# Patient Record
Sex: MALE | Race: WHITE | URBAN - METROPOLITAN AREA
[De-identification: names, ages, dates, MRNs, and addresses within clinical notes are randomized per-mention and may not be internally consistent; named-entity substitution may affect disease eponyms.]

---

## 2024-03-21 ENCOUNTER — HOSPITAL ENCOUNTER (OUTPATIENT)
Age: 39
Discharge: HOME | End: 2024-03-21
Payer: COMMERCIAL

## 2024-03-21 VITALS
TEMPERATURE: 99 F | WEIGHT: 270 LBS | RESPIRATION RATE: 18 BRPM | OXYGEN SATURATION: 98 % | HEART RATE: 73 BPM | SYSTOLIC BLOOD PRESSURE: 161 MMHG | HEIGHT: 75 IN | DIASTOLIC BLOOD PRESSURE: 98 MMHG | BODY MASS INDEX: 33.57 KG/M2

## 2024-03-21 DIAGNOSIS — L23.7 ALLERGIC DERMATITIS DUE TO POISON IVY: Primary | ICD-10-CM

## 2024-03-21 RX ORDER — ATORVASTATIN CALCIUM 10 MG/1
10 TABLET, FILM COATED ORAL NIGHTLY
COMMUNITY

## 2024-03-21 RX ORDER — PREDNISONE 20 MG/1
TABLET ORAL
Qty: 11 TABLET | Refills: 0 | Status: SHIPPED | OUTPATIENT
Start: 2024-03-21 | End: 2024-03-31

## 2024-03-21 NOTE — ED PROVIDER NOTES
Patient Seen in: [facility]      History     Chief Complaint   Patient presents with    Rash     I believe I have a poison ivy rash that is spreading. - Entered by patient    Rash Skin Problem     Stated Complaint: Rash - I believe I have a poison ivy rash that is spreading.    Subjective:   The history is provided by the patient.       37 yo male with no significant PMH presents to the  due to itchy rash x 1 week. The rash started 1 week ago - mainly on the wrist and now has spread on bilat UE and LE. Started as a \"bumpy rash\" but slowly \"drying up with calamine lotion\". Started after ripping out poison ivy at home- similar reaction in the past. Initially believed it was improving but now spreading over the last several days now on bilat arms and legs. No angioedema, difficulty breathing, abdominal pain, NV.     Objective:   No pertinent past medical history.            No pertinent past surgical history.              No pertinent social history.            Review of Systems   Constitutional: Negative.    Respiratory: Negative.     Cardiovascular: Negative.    Gastrointestinal: Negative.    Skin:  Positive for rash.       Positive for stated complaint: Rash - I believe I have a poison ivy rash that is spreading.  Other systems are as noted in HPI.  Constitutional and vital signs reviewed.      All other systems reviewed and negative except as noted above.    Physical Exam     ED Triage Vitals [03/21/24 0941]   BP (!) 161/98   Pulse 73   Resp 18   Temp 98.5 °F (36.9 °C)   Temp src Temporal   SpO2 98 %   O2 Device None (Room air)       Current:BP (!) 161/98   Pulse 73   Temp 98.5 °F (36.9 °C) (Temporal)   Resp 18   Ht 190.5 cm (6' 3\")   Wt 122.5 kg   SpO2 98%   BMI 33.75 kg/m²         Physical Exam  Vitals and nursing note reviewed.   Constitutional:       General: He is not in acute distress.     Appearance: Normal appearance. He is not toxic-appearing.   HENT:      Head:  Normocephalic.      Nose: Nose normal.      Mouth/Throat:      Comments: No angioedema   Eyes:      Extraocular Movements: Extraocular movements intact.      Conjunctiva/sclera: Conjunctivae normal.      Pupils: Pupils are equal, round, and reactive to light.   Pulmonary:      Effort: Pulmonary effort is normal.   Skin:     Comments: Erythematous papular rash which blanches on bilat wrists, UE and LE. No vesicles, no ulcers. No pedal edema.    Neurological:      General: No focal deficit present.      Mental Status: He is alert and oriented to person, place, and time.   Psychiatric:         Mood and Affect: Mood normal.         Behavior: Behavior normal.               ED Course   Labs Reviewed - No data to display                   MDM     ddx - dermatitis, allergic reaction, scabies     On exam the patient Is afebrile nontoxic.  Vital signs are stable.  No angioedema.  No systemic symptoms.  Erythematous macular papular rash which blanches.  Bilateral upper extremities lower extremities.  Few on the palms.  No oral lesions.  No pedal edema.  Exam is consistent with a dermatitis most likely related to poison ivy due to his exposure.  Will start on a long taper of prednisone.  Advised to use Zyrtec over-the-counter.  Strict return precautions were discussed at length and all questions were answered                             Medical Decision Making  Problems Addressed:  Allergic dermatitis due to poison ivy: acute illness or injury        Disposition and Plan     Clinical Impression:  1. Allergic dermatitis due to poison ivy         Disposition:  Discharge  3/21/2024 10:27 am    Follow-up:  [facility]  [address] [place] [id number]              Medications Prescribed:  Discharge Medication List as of 3/21/2024 10:27 AM        START taking these medications    Details   predniSONE 20 MG Oral Tab Take 2 tablets (40 mg total) by mouth daily for 3 days, THEN 1 tablet (20 mg total) daily for 3  days, THEN 0.5 tablets (10 mg total) daily for 4 days., Normal, Disp-11 tablet, R-0

## 2024-03-21 NOTE — DISCHARGE INSTRUCTIONS
Can use zyrtec daily to help with itching  Take the prednisone as directed  Follow up with primary care doctor in 48 hours  Return to the ER if symptoms worsen

## 2024-03-21 NOTE — ED INITIAL ASSESSMENT (HPI)
Pt c/o rashes to both lower arms and right hand. Pt states he was in the yard about 1 week ago. Pt states he's had poison ivy before and concerned this rash my be poison ivy. Pt has red scabbed over area to his arms and right hand.  Pt has some rashes to his lower legs. Pt states the rash is itchy.

## 2024-07-30 ENCOUNTER — OFFICE VISIT (OUTPATIENT)
Dept: INTERNAL MEDICINE CLINIC | Facility: CLINIC | Age: 39
End: 2024-07-30
Payer: COMMERCIAL

## 2024-07-30 VITALS
BODY MASS INDEX: 35.28 KG/M2 | SYSTOLIC BLOOD PRESSURE: 146 MMHG | HEART RATE: 77 BPM | RESPIRATION RATE: 16 BRPM | TEMPERATURE: 97 F | OXYGEN SATURATION: 97 % | WEIGHT: 272 LBS | DIASTOLIC BLOOD PRESSURE: 82 MMHG | HEIGHT: 73.5 IN

## 2024-07-30 DIAGNOSIS — M79.671 BILATERAL FOOT PAIN: ICD-10-CM

## 2024-07-30 DIAGNOSIS — I10 PRIMARY HYPERTENSION: Primary | ICD-10-CM

## 2024-07-30 DIAGNOSIS — Z13.29 THYROID DISORDER SCREEN: ICD-10-CM

## 2024-07-30 DIAGNOSIS — G47.33 OSA ON CPAP: ICD-10-CM

## 2024-07-30 DIAGNOSIS — E78.00 HYPERCHOLESTEREMIA: ICD-10-CM

## 2024-07-30 DIAGNOSIS — Z13.0 SCREENING FOR DEFICIENCY ANEMIA: ICD-10-CM

## 2024-07-30 DIAGNOSIS — M79.672 BILATERAL FOOT PAIN: ICD-10-CM

## 2024-07-30 PROCEDURE — 99203 OFFICE O/P NEW LOW 30 MIN: CPT | Performed by: FAMILY MEDICINE

## 2024-07-30 RX ORDER — LOSARTAN POTASSIUM 50 MG/1
1 TABLET ORAL DAILY
COMMUNITY
Start: 2024-04-16 | End: 2024-07-30

## 2024-07-30 RX ORDER — LOSARTAN POTASSIUM 50 MG/1
50 TABLET ORAL DAILY
Qty: 90 TABLET | Refills: 3 | Status: SHIPPED | OUTPATIENT
Start: 2024-07-30

## 2024-07-30 RX ORDER — ATORVASTATIN CALCIUM 20 MG/1
20 TABLET, FILM COATED ORAL EVERY MORNING
Qty: 90 TABLET | Refills: 3 | Status: SHIPPED | OUTPATIENT
Start: 2024-07-30

## 2024-07-30 RX ORDER — ATORVASTATIN CALCIUM 20 MG/1
20 TABLET, FILM COATED ORAL EVERY MORNING
COMMUNITY
End: 2024-07-30

## 2024-07-30 NOTE — PROGRESS NOTES
Bert Dalal  7/3/1985    Chief Complaint   Patient presents with    [facility]     RM 9 - DV    Establish care, NP       HPI:   Bert Dalal is a 39 year old male who presents to [facility]. He works in equipment sales and his wife is an NP at [employer]. He is on treatment for HTN and HLD. On LEONOR for CPAP. He is struggling to loose weight, feels limited due to chronic bilateral foot pain that has been present for a few years. Most notable after long periods of walking or activity.     Current Outpatient Medications   Medication Sig Dispense Refill    atorvastatin 20 MG Oral Tab Take 1 tablet (20 mg total) by mouth every morning.      losartan 50 MG Oral Tab Take 1 tablet (50 mg total) by mouth daily.        No Known Allergies   Past Medical History:    Essential hypertension    Hyperlipidemia      There is no problem list on file for this patient.     No past surgical history on file.   No family history on file.   Social History     Socioeconomic History    Marital status:    Tobacco Use    Smoking status: Never    Smokeless tobacco: Never   Vaping Use    Vaping status: Never Used   Substance and Sexual Activity    Alcohol use: Yes     Comment: Social drinker    Drug use: Not Currently     Social Determinants of Health     Food Insecurity: Low Risk  (4/10/2023)    Received from [facility]    Food Insecurity     Have there been times that your food ran out, and you didn't have money to get more?: No     Are there times that you worry that this might happen?: No   Transportation Needs: Low Risk  (4/10/2023)    Received from [facility]    Transportation Needs     Do you have trouble getting transportation to medical appointments?: No     How do you normally get to and from your appointments?: Other   Social Connections: Low Risk  (9/15/2021)    Received from [facility]  [facility]    Social Connections     Do you have someone you could call for help if needed?: Yes   Housing Stability: Low Risk  (4/10/2023)    [facility], [facility]    Housing Stability     Are you concerned about having a safe and reliable place to live?: No         REVIEW OF SYSTEMS:   GENERAL: feels well otherwise    EXAM:   /82   Pulse 77   Temp 97 °F (36.1 °C)   Resp 16   Ht 6' 1.5\" (1.867 m)   Wt 272 lb (123.4 kg)   SpO2 97%   BMI 35.40 kg/m²   GENERAL: Well developed, well nourished,in no apparent distress  LUNGS: normal work of breathing  CARDIO: RRR  MSK: pes planus bilaterally, no significant bony tenderness or tenderness through the plantar fascia.      ASSESSMENT AND PLAN:   Bert Dalal is a 39 year old male who presents to [facility] care    Primary hypertension  BP elevated in office. Will begin home monitoring over the next 4 weeks. Follow-up at that time with log and cuff.   - losartan 50 MG Oral Tab; Take 1 tablet (50 mg total) by mouth daily.  Dispense: 90 tablet; Refill: 3  - Comp Metabolic Panel (14)    Hypercholesteremia  Continue statin, lipid panel prior to CPE  - atorvastatin 20 MG Oral Tab; Take 1 tablet (20 mg total) by mouth every morning.  Dispense: 90 tablet; Refill: 3  - Comp Metabolic Panel (14)  - Lipid Panel    LEONOR on CPAP  Stable on CPAP  - Pulmonary Referral - In Network    Bilateral foot pain  Will begin evaluation with weightbearing radiographs. Recommend evaluation with Dr. Day.   - Podiatry Referral - In Network  - XR Foot Weightbearing (3 views), Left (CPT = 51100) - [facility] consult only; Future  - XR Foot Weightbearing (3 views), right (CPT = 94482) - [facility] consult only; Future    Obesity  Thyroid disorder screen  Will begin metabolic evaluation. Discussed exercise goals and dietary optimization. Discussed food logging with [employer] jeff. F/U at CPE  - TSH W Reflex To  Free T4    F/U in 4 weeks for CPE    All questions were answered and the patient agrees with the plan.     Thank you,  Feliciano Herrera MD

## 2024-08-26 ENCOUNTER — LAB ENCOUNTER (OUTPATIENT)
Dept: LAB | Age: 39
End: 2024-08-26
Attending: FAMILY MEDICINE
Payer: COMMERCIAL

## 2024-08-26 LAB
ALBUMIN SERPL-MCNC: 4.6 G/DL (ref 3.2–4.8)
ALBUMIN/GLOB SERPL: 1.7 {RATIO} (ref 1–2)
ALP LIVER SERPL-CCNC: 89 U/L
ALT SERPL-CCNC: 75 U/L
ANION GAP SERPL CALC-SCNC: 3 MMOL/L (ref 0–18)
AST SERPL-CCNC: 35 U/L (ref ?–34)
BASOPHILS # BLD AUTO: 0.07 X10(3) UL (ref 0–0.2)
BASOPHILS NFR BLD AUTO: 1.1 %
BILIRUB SERPL-MCNC: 0.8 MG/DL (ref 0.3–1.2)
BUN BLD-MCNC: 10 MG/DL (ref 9–23)
CALCIUM BLD-MCNC: 9.8 MG/DL (ref 8.7–10.4)
CHLORIDE SERPL-SCNC: 102 MMOL/L (ref 98–112)
CHOLEST SERPL-MCNC: 171 MG/DL (ref ?–200)
CO2 SERPL-SCNC: 31 MMOL/L (ref 21–32)
CREAT BLD-MCNC: 1.02 MG/DL
EGFRCR SERPLBLD CKD-EPI 2021: 96 ML/MIN/1.73M2 (ref 60–?)
EOSINOPHIL # BLD AUTO: 0.51 X10(3) UL (ref 0–0.7)
EOSINOPHIL NFR BLD AUTO: 7.9 %
ERYTHROCYTE [DISTWIDTH] IN BLOOD BY AUTOMATED COUNT: 13 %
FASTING PATIENT LIPID ANSWER: YES
FASTING STATUS PATIENT QL REPORTED: YES
GLOBULIN PLAS-MCNC: 2.7 G/DL (ref 2–3.5)
GLUCOSE BLD-MCNC: 118 MG/DL (ref 70–99)
HCT VFR BLD AUTO: 42.9 %
HDLC SERPL-MCNC: 35 MG/DL (ref 40–59)
HGB BLD-MCNC: 14.9 G/DL
IMM GRANULOCYTES # BLD AUTO: 0.03 X10(3) UL (ref 0–1)
IMM GRANULOCYTES NFR BLD: 0.5 %
LDLC SERPL CALC-MCNC: 102 MG/DL (ref ?–100)
LYMPHOCYTES # BLD AUTO: 2.25 X10(3) UL (ref 1–4)
LYMPHOCYTES NFR BLD AUTO: 34.8 %
MCH RBC QN AUTO: 29.2 PG (ref 26–34)
MCHC RBC AUTO-ENTMCNC: 34.7 G/DL (ref 31–37)
MCV RBC AUTO: 84.1 FL
MONOCYTES # BLD AUTO: 0.61 X10(3) UL (ref 0.1–1)
MONOCYTES NFR BLD AUTO: 9.4 %
NEUTROPHILS # BLD AUTO: 2.99 X10 (3) UL (ref 1.5–7.7)
NEUTROPHILS # BLD AUTO: 2.99 X10(3) UL (ref 1.5–7.7)
NEUTROPHILS NFR BLD AUTO: 46.3 %
NONHDLC SERPL-MCNC: 136 MG/DL (ref ?–130)
OSMOLALITY SERPL CALC.SUM OF ELEC: 282 MOSM/KG (ref 275–295)
PLATELET # BLD AUTO: 225 10(3)UL (ref 150–450)
POTASSIUM SERPL-SCNC: 3.8 MMOL/L (ref 3.5–5.1)
PROT SERPL-MCNC: 7.3 G/DL (ref 5.7–8.2)
RBC # BLD AUTO: 5.1 X10(6)UL
SODIUM SERPL-SCNC: 136 MMOL/L (ref 136–145)
TRIGL SERPL-MCNC: 193 MG/DL (ref 30–149)
TSI SER-ACNC: 1.75 MIU/ML (ref 0.55–4.78)
VLDLC SERPL CALC-MCNC: 32 MG/DL (ref 0–30)
WBC # BLD AUTO: 6.5 X10(3) UL (ref 4–11)

## 2024-08-26 PROCEDURE — 36415 COLL VENOUS BLD VENIPUNCTURE: CPT | Performed by: FAMILY MEDICINE

## 2024-08-26 PROCEDURE — 83036 HEMOGLOBIN GLYCOSYLATED A1C: CPT | Performed by: FAMILY MEDICINE

## 2024-08-26 PROCEDURE — 85025 COMPLETE CBC W/AUTO DIFF WBC: CPT | Performed by: FAMILY MEDICINE

## 2024-08-26 PROCEDURE — 80053 COMPREHEN METABOLIC PANEL: CPT | Performed by: FAMILY MEDICINE

## 2024-08-26 PROCEDURE — 80061 LIPID PANEL: CPT | Performed by: FAMILY MEDICINE

## 2024-08-26 PROCEDURE — 84443 ASSAY THYROID STIM HORMONE: CPT | Performed by: FAMILY MEDICINE

## 2024-08-27 ENCOUNTER — OFFICE VISIT (OUTPATIENT)
Dept: INTERNAL MEDICINE CLINIC | Facility: CLINIC | Age: 39
End: 2024-08-27
Payer: COMMERCIAL

## 2024-08-27 VITALS
TEMPERATURE: 97 F | OXYGEN SATURATION: 97 % | WEIGHT: 271 LBS | HEART RATE: 87 BPM | DIASTOLIC BLOOD PRESSURE: 80 MMHG | BODY MASS INDEX: 35 KG/M2 | SYSTOLIC BLOOD PRESSURE: 140 MMHG

## 2024-08-27 DIAGNOSIS — I10 PRIMARY HYPERTENSION: ICD-10-CM

## 2024-08-27 DIAGNOSIS — E78.00 HYPERCHOLESTEREMIA: ICD-10-CM

## 2024-08-27 DIAGNOSIS — R79.89 ELEVATED LFTS: ICD-10-CM

## 2024-08-27 DIAGNOSIS — Z00.00 WELLNESS EXAMINATION: Primary | ICD-10-CM

## 2024-08-27 DIAGNOSIS — Z80.42 FAMILY HISTORY OF PROSTATE CANCER: ICD-10-CM

## 2024-08-27 DIAGNOSIS — R73.01 ELEVATED FASTING BLOOD SUGAR: ICD-10-CM

## 2024-08-27 DIAGNOSIS — E66.9 OBESITY (BMI 30-39.9): ICD-10-CM

## 2024-08-27 LAB
EST. AVERAGE GLUCOSE BLD GHB EST-MCNC: 160 MG/DL (ref 68–126)
HBA1C MFR BLD: 7.2 % (ref ?–5.7)

## 2024-08-27 PROCEDURE — 99395 PREV VISIT EST AGE 18-39: CPT | Performed by: FAMILY MEDICINE

## 2024-08-27 RX ORDER — LOSARTAN POTASSIUM 100 MG/1
100 TABLET ORAL DAILY
Qty: 90 TABLET | Refills: 1 | Status: SHIPPED | OUTPATIENT
Start: 2024-08-27

## 2024-08-27 NOTE — PROGRESS NOTES
Bert Dalal  7/3/1985    Chief Complaint   Patient presents with    Physical     F/u on lab results and review BP readings        HPI:   Bert Dalal is a 39 year old male who presents for CPE. His BP has been mild elevated at home 140/80-90's. He has been consistent with his atorvastatin and losartan. He has not yet implemented strong dietary changes and has yet started to exercise.     Current Outpatient Medications   Medication Sig Dispense Refill    atorvastatin 20 MG Oral Tab Take 1 tablet (20 mg total) by mouth every morning. 90 tablet 3    losartan 50 MG Oral Tab Take 1 tablet (50 mg total) by mouth daily. 90 tablet 3      No Known Allergies   Past Medical History:    Essential hypertension    Hyperlipidemia      There is no problem list on file for this patient.     History reviewed. No pertinent surgical history.   History reviewed. No pertinent family history.   Social History     Socioeconomic History    Marital status:    Tobacco Use    Smoking status: Never    Smokeless tobacco: Never   Vaping Use    Vaping status: Never Used   Substance and Sexual Activity    Alcohol use: Yes     Comment: Social drinker    Drug use: Not Currently     Social Determinants of Health     Food Insecurity: Low Risk  (4/10/2023)    Received from [facility]    Food Insecurity     Have there been times that your food ran out, and you didn't have money to get more?: No     Are there times that you worry that this might happen?: No   Transportation Needs: Low Risk  (4/10/2023)    Received from [facility]    Transportation Needs     Do you have trouble getting transportation to medical appointments?: No     How do you normally get to and from your appointments?: Other   Social Connections: Low Risk  (9/15/2021)    Received from [facility]    Social  Connections     Do you have someone you could call for help if needed?: Yes   Housing Stability: Low Risk  (4/10/2023)    [facility], [facility]    Housing Stability     Are you concerned about having a safe and reliable place to live?: No         REVIEW OF SYSTEMS:   GENERAL: feels well otherwise  SKIN: no rashes  EYES: no new vision changes  HEENT: not congested  LUNGS: no new dyspnea  CARDIOVASCULAR: no new chest pain  GI: no new abdominal pain  NEURO: no headaches    EXAM:   /74 (BP Location: Right arm, Patient Position: Sitting, Cuff Size: large)   Pulse 87   Temp 97 °F (36.1 °C) (Temporal)   Wt 271 lb (122.9 kg)   SpO2 97%   BMI 35.27 kg/m²   GENERAL: Well developed, well nourished,in no apparent distress  SKIN: No rashes,no suspicious lesions  EYES: PERRLA, EOMI, conjunctiva are clear  HEENT: atraumatic, normocephalic,ears and throat are clear  NECK: supple,no adenopathy,no bruits  LUNGS: clear to auscultation  CARDIO: RRR without murmur  GI: good BS's,no masses, HSM or tenderness    ASSESSMENT AND PLAN:   Bert Dalal is a 39 year old male who presents for CPE    Wellness examination  Discussed age appropriate health and wellness.     Elevated fasting blood sugar  Elevated LFTs  Obesity (BMI 30-39.9)  A1c pending. Discussed weight loss and diet/exercise optimization. Goal weight loss is 10% of current body weight. Discussed starting metformin for weight loss and glycemic control pending A1c. F/U in 3 months for with labs prior.     Hypercholesteremia  Continue statin, recheck lipid panel in 3 months.     Primary hypertension  Increase losartan to 100 mg daily. Continue home monitoring. Provided DASH diet info. Follow-up in 3 months.   - losartan 100 MG Oral Tab; Take 1 tablet (100 mg total) by mouth daily.  Dispense: 90 tablet; Refill: 1    Family history of prostate cancer  Will begin screening at age 40.       All questions were answered  and the patient agrees with the plan.     Thank you,  Feliciano Herrera MD

## 2024-08-28 ENCOUNTER — HOSPITAL ENCOUNTER (OUTPATIENT)
Dept: GENERAL RADIOLOGY | Age: 39
Discharge: HOME | End: 2024-08-28
Attending: FAMILY MEDICINE
Payer: COMMERCIAL

## 2024-08-28 DIAGNOSIS — M79.672 BILATERAL FOOT PAIN: ICD-10-CM

## 2024-08-28 DIAGNOSIS — M79.671 BILATERAL FOOT PAIN: ICD-10-CM

## 2024-08-28 PROCEDURE — 73630 X-RAY EXAM OF FOOT: CPT | Performed by: FAMILY MEDICINE

## 2024-08-30 ENCOUNTER — TELEPHONE (OUTPATIENT)
Dept: INTERNAL MEDICINE CLINIC | Facility: CLINIC | Age: 39
End: 2024-08-30

## 2024-08-30 DIAGNOSIS — E66.01 CLASS 2 SEVERE OBESITY DUE TO EXCESS CALORIES WITH SERIOUS COMORBIDITY AND BODY MASS INDEX (BMI) OF 35.0 TO 35.9 IN ADULT (HCC): ICD-10-CM

## 2024-08-30 DIAGNOSIS — R73.03 PREDIABETES: Primary | ICD-10-CM

## 2024-08-30 PROBLEM — G47.33 OSA ON CPAP: Chronic | Status: ACTIVE | Noted: 2021-12-16

## 2024-08-30 NOTE — TELEPHONE ENCOUNTER
Submitted PRIOR AUTHORIZATION for Wegovy through [employer]. Answered Questions. Await response.

## 2024-08-30 NOTE — TELEPHONE ENCOUNTER
Approved    Prior authorization approved  Payer: [employer] Case ID: [id number]    [phone]    [phone]  Note from payer: CaseId:[id number];Status:Approved;Review Type:Prior Auth;Coverage Start Date:2024;Coverage End Date:2025;  Approval Details    Authorized from 2024 to 2025  Electronic appeal: Not supported  View History  Pharmacy Benefits   Open Encounter ROSSY MIR Non Domestic ([employer])    Covered: Retail    Not covered: Mail Order    Unknown: Specialty, Long-Term Care  Member ID: [id number] BIN: [id number] : 7/3/1985   Group ID: [id number] PCN: [id number] Legal sex: M   Group name: [id number]   Address: [address] [place] [id number]    Medication Being Authorized    semaglutide-weight management 0.25 MG/0.5ML Subcutaneous Solution Auto-injector  Inject 0.5 mL (0.25 mg total) into the skin once a week for 4 doses.  Dispense: 2 mL Refills: 0   Start: 2024 End: 2024   Class: Normal Diagnoses: Prediabetes, Class 2 severe obesity due to excess calories with serious comorbidity and body mass index (BMI) of 35.0 to 35.9 in adult (HCC)   This order has been released to its destination.  To be filled at: [id number], [place] - [address] [facility], [phone], [phone]       [facility] sent to patient.      FYI sent to DR PRANAV CAR.

## 2024-09-03 ENCOUNTER — TELEPHONE (OUTPATIENT)
Dept: INTERNAL MEDICINE CLINIC | Facility: CLINIC | Age: 39
End: 2024-09-03

## 2024-09-03 NOTE — TELEPHONE ENCOUNTER
Called and spoke with pt. Relayed MK's message below. He verbalized understanding and is agreeable to plan.

## 2024-09-03 NOTE — TELEPHONE ENCOUNTER
Glatyson he was able to start the medication. Update needed after his third dose of the medication and if tolerating well, will titrate the dose up to the next dose starting the 5th week.

## 2024-09-03 NOTE — TELEPHONE ENCOUNTER
Pt called. He states he started Wegovy on Saturday and MK asked him to call with an update to see how he was feeling. He states he is feeling well. Has not experienced any side effects of the medication. He is asking what are next steps? Will dosage be increased?    Routing to MK to update.

## 2024-09-17 ENCOUNTER — OFFICE VISIT (OUTPATIENT)
Dept: INTERNAL MEDICINE CLINIC | Facility: CLINIC | Age: 39
End: 2024-09-17
Payer: COMMERCIAL

## 2024-09-17 VITALS
OXYGEN SATURATION: 97 % | WEIGHT: 267 LBS | SYSTOLIC BLOOD PRESSURE: 132 MMHG | BODY MASS INDEX: 35 KG/M2 | HEART RATE: 72 BPM | TEMPERATURE: 97 F | DIASTOLIC BLOOD PRESSURE: 82 MMHG

## 2024-09-17 DIAGNOSIS — R00.1 BRADYCARDIA: ICD-10-CM

## 2024-09-17 DIAGNOSIS — E66.9 OBESITY (BMI 30-39.9): Primary | ICD-10-CM

## 2024-09-17 DIAGNOSIS — R73.09 ELEVATED HEMOGLOBIN A1C: ICD-10-CM

## 2024-09-17 LAB
ATRIAL RATE: 52 BPM
P AXIS: 43 DEGREES
P-R INTERVAL: 138 MS
Q-T INTERVAL: 428 MS
QRS DURATION: 102 MS
QTC CALCULATION (BEZET): 398 MS
R AXIS: 18 DEGREES
T AXIS: 62 DEGREES
VENTRICULAR RATE: 52 BPM

## 2024-09-17 PROCEDURE — 99214 OFFICE O/P EST MOD 30 MIN: CPT | Performed by: FAMILY MEDICINE

## 2024-09-17 PROCEDURE — 93000 ELECTROCARDIOGRAM COMPLETE: CPT | Performed by: FAMILY MEDICINE

## 2024-09-17 NOTE — PROGRESS NOTES
Bert Dalal  7/3/1985    Chief Complaint   Patient presents with    Follow - Up     F/u on medication and drop in HR while sleeping       HPI:   Bert Dalal is a 39 year old male who presents for follow-up. He has noted his HR dropping at night to the high 30's at night. Noted on his smart watch. Asymptomatic and has not symptoms or bradycardia during the day. Correlates this to the start of Wegovy, but is tolerating the medication well and is down 10 lbs already with dietary modifications as well.     Current Outpatient Medications   Medication Sig Dispense Refill    semaglutide-weight management 0.5 MG/0.5ML Subcutaneous Solution Auto-injector Inject 0.5 mL (0.5 mg total) into the skin once a week for 4 doses. 2 mL 0    losartan 100 MG Oral Tab Take 1 tablet (100 mg total) by mouth daily. 90 tablet 1    atorvastatin 20 MG Oral Tab Take 1 tablet (20 mg total) by mouth every morning. 90 tablet 3      No Known Allergies   Past Medical History:    Essential hypertension    Hyperlipidemia      Patient Active Problem List   Diagnosis    Primary hypertension    Hypercholesteremia    LEONOR on CPAP      History reviewed. No pertinent surgical history.   History reviewed. No pertinent family history.   Social History     Socioeconomic History    Marital status:    Tobacco Use    Smoking status: Never    Smokeless tobacco: Never   Vaping Use    Vaping status: Never Used   Substance and Sexual Activity    Alcohol use: Yes     Comment: Social drinker    Drug use: Not Currently     Social Determinants of Health     Food Insecurity: Low Risk  (4/10/2023)    Received from [facility]    Food Insecurity     Have there been times that your food ran out, and you didn't have money to get more?: No     Are there times that you worry that this might happen?: No   Transportation Needs: Low Risk  (4/10/2023)    Received from [facility]  [facility]    Transportation Needs     Do you have trouble getting transportation to medical appointments?: No     How do you normally get to and from your appointments?: Other   Social Connections: Low Risk  (9/15/2021)    Received from [facility]    Social Connections     Do you have someone you could call for help if needed?: Yes   Housing Stability: Low Risk  (4/10/2023)    Received from [facility]    Housing Stability     Are you concerned about having a safe and reliable place to live?: No         REVIEW OF SYSTEMS:   GENERAL: feels well otherwise  SKIN: no rashes  EYES: no new vision changes  HEENT: not congested  LUNGS: no new dyspnea  CARDIOVASCULAR: no new chest pain  GI: no new abdominal pain  NEURO: no headaches    EXAM:   /82   Pulse 72   Temp 97 °F (36.1 °C) (Temporal)   Wt 267 lb (121.1 kg)   SpO2 97%   BMI 34.75 kg/m²   GENERAL: Well developed, well nourished,in no apparent distress  SKIN: No rash  EYES: PERRLA, EOMI, conjunctiva are clear  HEENT: atraumatic, normocephalic  LUNGS: clear to auscultation  CARDIO: RRR without murmur    ASSESSMENT AND PLAN:   Bert Dalal is a 39 year old male who presents for follow-up    Obesity (BMI 30-39.9)  Elevated hemoglobin A1c  Tolerating well. Will continue to titrate. Already down 10 lbs from home scale. Continue lifestyle optimization  - semaglutide-weight management 0.5 MG/0.5ML Subcutaneous Solution Auto-injector; Inject 0.5 mL (0.5 mg total) into the skin once a week for 4 doses.  Dispense: 2 mL; Refill: 0    Bradycardia  Occurring at night with HR in the high 30's at times. EKG in office shows sinus bradycardia with rate of 52. Asymptomatic. Discussed normal heart rate variability during sleep. Will CTM  - ELECTROCARDIOGRAM, COMPLETE      All questions were answered and the patient agrees with the plan.     Thank  you,  Feliciano Herrera MD

## 2024-10-15 ENCOUNTER — TELEPHONE (OUTPATIENT)
Dept: INTERNAL MEDICINE CLINIC | Facility: CLINIC | Age: 39
End: 2024-10-15

## 2024-10-15 DIAGNOSIS — R73.09 ELEVATED HEMOGLOBIN A1C: Primary | ICD-10-CM

## 2024-10-15 DIAGNOSIS — E66.9 OBESITY (BMI 30-39.9): ICD-10-CM

## 2024-10-15 NOTE — TELEPHONE ENCOUNTER
Triage call transferred.   Spoke with pt stating has been tolerating Wegovy well. No SE with either 0.25 or 0.5. Pt inquiring if PCP will increase dose or remain at 0.5?  Confirmed [facility] pharmacy on file.  Informed will relay to PCP for further recommendations.  Pt verbalized understanding and agreed with POC.    Please advise. Thank you.

## 2024-11-13 ENCOUNTER — PATIENT MESSAGE (OUTPATIENT)
Dept: INTERNAL MEDICINE CLINIC | Facility: CLINIC | Age: 39
End: 2024-11-13

## 2024-11-14 RX ORDER — SEMAGLUTIDE 1 MG/.5ML
1 INJECTION, SOLUTION SUBCUTANEOUS WEEKLY
Qty: 2 ML | Refills: 0 | Status: SHIPPED | OUTPATIENT
Start: 2024-11-14 | End: 2024-12-14

## 2024-11-14 NOTE — TELEPHONE ENCOUNTER
Please review. Protocol Failed; No Protocol    Requested Prescriptions   Pending Prescriptions Disp Refills    WEGOVY 1 MG/0.5ML Subcutaneous Solution Auto-injector [Pharmacy Med Name: WEGOVY 1 MG/0.5 ML PEN]  0     Sig: INJECT 0.5 ML (1 MG TOTAL) INTO THE SKIN ONCE A WEEK FOR 4 DOSES.       There is no refill protocol information for this order            Future Appointments         Provider Department Appt Notes    In 3 weeks Feliciano Herrera MD [address] FUP-3 month          Recent Outpatient Visits              1 month ago Obesity (BMI 30-39.9)    [facility] [address] [place], [place] [address]Leo Michael, MD    Office Visit    2 months ago Wellness examination    [facility] [address] [place], [place] [address]Leo Michael, MD    Office Visit    3 months ago Primary hypertension    [facility] [address] [place], [place] [address]Leo Michael, MD    Office Visit

## 2024-12-04 ENCOUNTER — LAB ENCOUNTER (OUTPATIENT)
Dept: LAB | Age: 39
End: 2024-12-04
Attending: FAMILY MEDICINE
Payer: COMMERCIAL

## 2024-12-04 DIAGNOSIS — R73.01 ELEVATED FASTING BLOOD SUGAR: ICD-10-CM

## 2024-12-04 DIAGNOSIS — E78.00 HYPERCHOLESTEREMIA: ICD-10-CM

## 2024-12-04 DIAGNOSIS — R73.09 ELEVATED HEMOGLOBIN A1C: ICD-10-CM

## 2024-12-04 DIAGNOSIS — R79.89 ELEVATED LFTS: ICD-10-CM

## 2024-12-04 LAB
ALBUMIN SERPL-MCNC: 4.7 G/DL (ref 3.2–4.8)
ALBUMIN/GLOB SERPL: 1.8 {RATIO} (ref 1–2)
ALP LIVER SERPL-CCNC: 85 U/L
ALT SERPL-CCNC: 43 U/L
ANION GAP SERPL CALC-SCNC: 8 MMOL/L (ref 0–18)
AST SERPL-CCNC: 21 U/L (ref ?–34)
BILIRUB SERPL-MCNC: 0.6 MG/DL (ref 0.3–1.2)
BUN BLD-MCNC: 13 MG/DL (ref 9–23)
CALCIUM BLD-MCNC: 9.5 MG/DL (ref 8.7–10.4)
CHLORIDE SERPL-SCNC: 105 MMOL/L (ref 98–112)
CHOLEST SERPL-MCNC: 120 MG/DL (ref ?–200)
CO2 SERPL-SCNC: 28 MMOL/L (ref 21–32)
CREAT BLD-MCNC: 1.04 MG/DL
EGFRCR SERPLBLD CKD-EPI 2021: 94 ML/MIN/1.73M2 (ref 60–?)
EST. AVERAGE GLUCOSE BLD GHB EST-MCNC: 117 MG/DL (ref 68–126)
FASTING PATIENT LIPID ANSWER: YES
FASTING STATUS PATIENT QL REPORTED: YES
GLOBULIN PLAS-MCNC: 2.6 G/DL (ref 2–3.5)
GLUCOSE BLD-MCNC: 92 MG/DL (ref 70–99)
HBA1C MFR BLD: 5.7 % (ref ?–5.7)
HDLC SERPL-MCNC: 38 MG/DL (ref 40–59)
LDLC SERPL CALC-MCNC: 64 MG/DL (ref ?–100)
NONHDLC SERPL-MCNC: 82 MG/DL (ref ?–130)
OSMOLALITY SERPL CALC.SUM OF ELEC: 292 MOSM/KG (ref 275–295)
POTASSIUM SERPL-SCNC: 4.2 MMOL/L (ref 3.5–5.1)
PROT SERPL-MCNC: 7.3 G/DL (ref 5.7–8.2)
SODIUM SERPL-SCNC: 141 MMOL/L (ref 136–145)
TRIGL SERPL-MCNC: 91 MG/DL (ref 30–149)
VLDLC SERPL CALC-MCNC: 14 MG/DL (ref 0–30)

## 2024-12-04 PROCEDURE — 80061 LIPID PANEL: CPT

## 2024-12-04 PROCEDURE — 80053 COMPREHEN METABOLIC PANEL: CPT

## 2024-12-04 PROCEDURE — 36415 COLL VENOUS BLD VENIPUNCTURE: CPT

## 2024-12-04 PROCEDURE — 83036 HEMOGLOBIN GLYCOSYLATED A1C: CPT

## 2024-12-05 ENCOUNTER — OFFICE VISIT (OUTPATIENT)
Dept: INTERNAL MEDICINE CLINIC | Facility: CLINIC | Age: 39
End: 2024-12-05
Payer: COMMERCIAL

## 2024-12-05 VITALS
BODY MASS INDEX: 31.31 KG/M2 | WEIGHT: 241.38 LBS | HEIGHT: 73.5 IN | SYSTOLIC BLOOD PRESSURE: 122 MMHG | TEMPERATURE: 97 F | RESPIRATION RATE: 18 BRPM | OXYGEN SATURATION: 98 % | DIASTOLIC BLOOD PRESSURE: 74 MMHG | HEART RATE: 68 BPM

## 2024-12-05 DIAGNOSIS — E78.00 HYPERCHOLESTEREMIA: ICD-10-CM

## 2024-12-05 DIAGNOSIS — E66.9 OBESITY (BMI 30-39.9): Primary | ICD-10-CM

## 2024-12-05 DIAGNOSIS — R73.09 ELEVATED HEMOGLOBIN A1C: ICD-10-CM

## 2024-12-05 PROCEDURE — 90471 IMMUNIZATION ADMIN: CPT | Performed by: FAMILY MEDICINE

## 2024-12-05 PROCEDURE — 90656 IIV3 VACC NO PRSV 0.5 ML IM: CPT | Performed by: FAMILY MEDICINE

## 2024-12-05 PROCEDURE — 99214 OFFICE O/P EST MOD 30 MIN: CPT | Performed by: FAMILY MEDICINE

## 2024-12-05 NOTE — PROGRESS NOTES
Bert Dalal  7/3/1985    Chief Complaint   Patient presents with    Follow - Up     EJ RM 8- Pt is here for 3 mos f/u       HPI:   Bert Dalal is a 39 year old male who presents for follow-up. He has done very well on Wegovy. He is down 30 lbs and ran his fist 4 mile race over the Thanksgiving holiday. He has tolerated treatment well. Labs show significant improvement in glycemic control.    Current Outpatient Medications   Medication Sig Dispense Refill    semaglutide-weight management 2.4 MG/0.75ML Subcutaneous Solution Auto-injector Inject 0.75 mL (2.4 mg total) into the skin once a week for 12 doses. 9 mL 0    losartan 100 MG Oral Tab Take 1 tablet (100 mg total) by mouth daily. 90 tablet 1    atorvastatin 20 MG Oral Tab Take 1 tablet (20 mg total) by mouth every morning. 90 tablet 3      Allergies[1]   Past Medical History:    Essential hypertension    Hyperlipidemia      Patient Active Problem List   Diagnosis    Primary hypertension    Hypercholesteremia    LEONOR on CPAP      History reviewed. No pertinent surgical history.   History reviewed. No pertinent family history.   Social History     Socioeconomic History    Marital status:    Tobacco Use    Smoking status: Never    Smokeless tobacco: Never   Vaping Use    Vaping status: Never Used   Substance and Sexual Activity    Alcohol use: Yes     Comment: Social drinker    Drug use: Not Currently     Social Drivers of Health     Food Insecurity: Low Risk  (4/10/2023)    Received from [facility]    Food Insecurity     Have there been times that your food ran out, and you didn't have money to get more?: No     Are there times that you worry that this might happen?: No   Transportation Needs: Low Risk  (4/10/2023)    Received from [facility]    Transportation Needs     Do you have trouble getting transportation to medical appointments?: No      How do you normally get to and from your appointments?: Other   Social Connections: Low Risk  (9/15/2021)    Received from [facility]    Social Connections     Do you have someone you could call for help if needed?: Yes   Housing Stability: Low Risk  (4/10/2023)    Received from [facility]    Housing Stability     Are you concerned about having a safe and reliable place to live?: No         REVIEW OF SYSTEMS:   GENERAL: feels well otherwise, no recent illness.     EXAM:   /74   Pulse 68   Temp 97.3 °F (36.3 °C) (Temporal)   Resp 18   Ht 6' 1.5\" (1.867 m)   Wt 241 lb 6.4 oz (109.5 kg)   SpO2 98%   BMI 31.42 kg/m²   GENERAL: Well developed, well nourished,in no apparent distress  SKIN: No rash  HEENT: atraumatic, normocephalic  LUNGS: normal work of breathing   CARDIO: regular rate    ASSESSMENT AND PLAN:   Bert Dalal is a 39 year old male who presents for follow-up    Obesity (BMI 30-39.9)  Elevated hemoglobin A1c  Hypercholesteremia  He has done significantly well with weight loss and Wegovy.  He is down 30 pounds and has had significant improvement in his glycemic control.  He is tolerating his statin well.  We will continue lifestyle optimization and continue his medical weight loss treatment.  His current goal weight is 220 pounds.  We will follow-up in 3 months to assess his progress.  - semaglutide-weight management 2.4 MG/0.75ML Subcutaneous Solution Auto-injector; Inject 0.75 mL (2.4 mg total) into the skin once a week for 12 doses.  Dispense: 9 mL; Refill: 0      All questions were answered and the patient agrees with the plan.     Thank you,  Feliciano Herrera MD         [1] No Known Allergies

## 2024-12-05 NOTE — TELEPHONE ENCOUNTER
Triage call transferred.   Spoke with pt, per insurance will not cover semaglutide-weight management 2.4 MG/0.75ML.  Per pt, needs to try next titrated dose of 1.7 MG.  Informed will relay to PCP update and rx request. Pt verbalized understanding and agreed with POC.    Rx pended for your approval if ok.  Please review and advise. Thank you.

## 2025-01-23 ENCOUNTER — TELEPHONE (OUTPATIENT)
Dept: INTERNAL MEDICINE CLINIC | Facility: CLINIC | Age: 40
End: 2025-01-23

## 2025-02-24 ENCOUNTER — OFFICE VISIT (OUTPATIENT)
Facility: CLINIC | Age: 40
End: 2025-02-24
Payer: COMMERCIAL

## 2025-02-24 VITALS
SYSTOLIC BLOOD PRESSURE: 132 MMHG | DIASTOLIC BLOOD PRESSURE: 70 MMHG | BODY MASS INDEX: 31.13 KG/M2 | RESPIRATION RATE: 16 BRPM | OXYGEN SATURATION: 97 % | HEART RATE: 90 BPM | TEMPERATURE: 98 F | WEIGHT: 240 LBS | HEIGHT: 73.5 IN

## 2025-02-24 DIAGNOSIS — J30.9 ALLERGIC RHINITIS, UNSPECIFIED SEASONALITY, UNSPECIFIED TRIGGER: ICD-10-CM

## 2025-02-24 DIAGNOSIS — G47.33 OSA ON CPAP: Chronic | ICD-10-CM

## 2025-02-24 DIAGNOSIS — I10 PRIMARY HYPERTENSION: Primary | ICD-10-CM

## 2025-02-24 PROCEDURE — 99204 OFFICE O/P NEW MOD 45 MIN: CPT | Performed by: INTERNAL MEDICINE

## 2025-02-24 NOTE — PATIENT INSTRUCTIONS
Plan:    Continue current auto CPAP 5-15 cwp as patient is using and benefiting from CPAP use  Arrange a NEW [employer] company for supplies   [place] [facility] consult   Advised about weight loss   Advised about consult with [place] [facility] program   Advised against drowsy driving and to avoid alcoholic beverage and respiratory depressants as these may worsen sleep apnea      Follow up: 4  months     Jordin Sy MD      Obstructive Sleep Apnea  Obstructive sleep apnea is a condition caused by air passages becoming narrowed or blocked during sleep. As a result, breathing stops for short periods. Your body wakes up enough for breathing to start again. But you don't remember it. The cycle of stopped breathing and brief awakenings can repeat dozens of times a night. This prevents the body from getting to the deeper stages of sleep that are needed for good rest.   Signs of sleep apnea include loud snoring, noisy breathing, and gasping sounds during sleep. People with sleep apnea often find they use the bathroom many times during the night. Daytime symptoms include waking up tired after a full night's sleep and waking up with headaches. They can also include feeling very sleepy or falling asleep during the day, and having problems with memory or concentration.   Risk factors for sleep apnea include:  Being overweight  Being assigned male at birth, or being in menopause  Smoking  Using alcohol or sedating medicines  Having enlarged structures in the nose or throat such as enlarged tonsils or adenoids, or extra tissue in the airway  Home care  Lifestyle changes that can help treat snoring and sleep apnea include:   If you're overweight, talk with your healthcare provider about a weight-loss plan for you.  Don't drink alcohol for 3 to 4 hours before bedtime.  Don't take sedating medicines. Ask your healthcare provider about the medicines you take.  If you smoke, talk to your provider about ways to  quit. It's important to stay away from secondhand smoke. Don't use e-cigarettes because of their harmful side effects.  Sleep on your side. This can help prevent gravity from pulling relaxed throat tissues into your breathing passages.  If you have allergies or sinus problems that block your nose, ask your provider for help.  Use positive airway pressure (PAP). Discuss with your provider the benefits of using PAP at home. And talk about the type of PAP that's best for you.  Follow-up care  Follow up with your healthcare provider, or as advised. A diagnosis of sleep apnea is made with a sleep study. Your provider can tell you more about this test.   When to get medical care  See your healthcare provider if you have daytime symptoms of sleep apnea. These include:   Waking up tired after a full night's sleep  Waking up with a headache  Feeling very sleepy or falling asleep during the day  Having problems with memory or concentration  Also talk with your provider if your partner tells you that you snore, gasp for air, or stop breathing while you sleep.   Seeing your provider is important because sleep apnea can make you more likely to have certain health problems. These include high blood pressure, heart attack, stroke, and sexual dysfunction. If you have sleep apnea, talk with your healthcare provider about the best treatments for you.   [employer] last reviewed this educational content on 5/1/2022 © 2000-2023 [employer]. All rights reserved. This information is not intended as a substitute for professional medical care. Always follow your healthcare professional's instructions.        Continuous Positive Air Pressure (CPAP)     A mask over the nose gently directs air into the throat to keep the airway open.     Continuous positive air pressure (CPAP) uses gentle air pressure to hold the airway open. CPAP is often the most effective treatment for sleep apnea. It works very well as a treatment for adults  diagnosed with obstructive sleep apnea with a lot of sleepiness. But keep in mind that it can take several adjustments before the setup is right for you.   How CPAP works  The CPAP machine  is a small portable pump that sits beside the bed. The pump sends air through a hose, which is held over your nose alone, or nose and mouth by a mask. Mild air pressure is gently pushed through your airway. The air pressure nudges sagging tissues aside. This widens the airway so you can breathe better. CPAP may be combined with other kinds of therapy for sleep apnea.   Types of air pressure treatments  There are different types of CPAP. Your doctor or CPAP technician will help you decide which type is best for you:   Basic CPAP keeps the pressure constant all night long.  A bilevel device (BiPAP) provides more pressure when you breathe in and less when you breathe out. A BiPAP machine also may be set to provide automatic breaths to maintain breathing if you stop breathing while sleeping.  An autoCPAP device automatically adjusts pressure throughout the night and in response to changes such as body position, sleep stage, and snoring.  [employer] last reviewed this educational content on 7/1/2019  © 7159-1977 [employer]. All rights reserved. This information is not intended as a substitute for professional medical care. Always follow your healthcare professional's instructions.

## 2025-02-24 NOTE — PROGRESS NOTES
Pulmonary/Critical Care/Sleep Medicine    Consult Note     PCP: Feliciano Herrera MD   Phone: [phone]   Fax: [phone]        Chief Complaint   Patient presents with    Obstructive Sleep Apnea (LEONOR)     Sleep consult, no DME pefers nasal pillows       HPI  I had the pleasure of seeing Bert Dalal who is a pleasant 39 year old male who presents for evaluation of LEONOR       The patient states that he has snored at home in past and was noted to have witnessed apnea , he has been diagnosed with LEONOR in 4/2022 and has been on CPAP, His insurance changed so he came to re-establish care with a NEW physician .       He states bed time around 10-11 PM . It takes 20-30 min to fall asleep and leaves bed around 6  AM. He wakes up 2  times a night.  He is fatigued during the daytime.  He denies nightmares, sleep talking or sleep walking.  He admits denies AM headaches.  He denies symptoms sleep attacks     The patient denies kicking legs at night.   Admits to teeth grinding, he thinks .    He has lost 30 pounds of weight on his own recently.    The patient reports using auto CPAP daily in the morning at 5-15  cmH2O regularly throughout the night for about 7 hours and states that the  PAP machine is quiet and has a heated humidifier.     He drinks 2 cups of caffeine coffee daily,  and  occ caffeine cans of soda daily.       He has pets 2 dogs  that do occ sleep in bed.         Hx of tobacco use: He  reports that he has never smoked. He has never used smokeless tobacco.    Past Medical History:    Essential hypertension    Hyperlipidemia    Obesity    Sleep apnea      Past Surgical History:   Procedure Laterality Date    Vasectomy  11/2019     Allergies[1]  Current Outpatient Medications   Medication Sig Dispense Refill    losartan 100 MG Oral Tab Take 1 tablet (100 mg total) by mouth daily. 90 tablet 1    atorvastatin 20 MG Oral Tab Take 1 tablet (20 mg total) by mouth every morning. 90 tablet 3    semaglutide-weight  management 1.7 MG/0.75ML Subcutaneous Solution Auto-injector Inject 0.75 mL (1.7 mg total) into the skin once a week. (Patient not taking: Reported on 2/24/2025) 9 mL 0      Social History     Socioeconomic History    Marital status:    Occupational History    Occupation: [employer]     Comment: [employer]   Tobacco Use    Smoking status: Never    Smokeless tobacco: Never   Vaping Use    Vaping status: Never Used   Substance and Sexual Activity    Alcohol use: Yes     Alcohol/week: 6.0 standard drinks of alcohol     Types: 6 Cans of beer per week     Comment: Social drinker    Drug use: Never     Social Drivers of Health     Food Insecurity: Low Risk  (4/10/2023)    Received from [facility]    Food Insecurity     Have there been times that your food ran out, and you didn't have money to get more?: No     Are there times that you worry that this might happen?: No   Transportation Needs: Low Risk  (4/10/2023)    Received from [facility]    Transportation Needs     Do you have trouble getting transportation to medical appointments?: No     How do you normally get to and from your appointments?: Other   Housing Stability: Low Risk  (4/10/2023)    Received from [facility]    Housing Stability     Are you concerned about having a safe and reliable place to live?: No      Immunization History   Administered Date(s) Administered    Covid-19 Vaccine Pfizer 30 mcg/0.3 ml 03/23/2021, 04/13/2021, 12/03/2021    DTP 09/21/1985, 11/23/1985, 01/15/1986, 06/20/1990    FLUZONE 6 months and older PFS 0.5 ml (75243) 10/17/2016, 10/05/2017, 11/05/2018, 10/24/2019, 09/25/2020, 09/15/2021    HEP B, Ped/Adol 07/09/2003, 08/14/2003, 05/19/2004    Influenza 10/17/2016    Influenza Vaccine, trivalent (IIV3), PF 0.5mL (55536) 12/05/2024    MMR 11/07/1986,  02/17/1993    Meningococcal (Menomune) 07/09/2003    OPV 09/21/1985, 11/23/1985, 01/15/1986, 06/20/1990    TDAP 04/03/2012, 11/21/2022    Td, Preserv Free 05/05/1999      Family History   Problem Relation Age of Onset    Hypertension Mother     Diabetes Maternal Grandfather     Pulmonary Disease Maternal Grandfather     Stroke Maternal Grandmother     Diabetes Paternal Grandmother     Pulmonary Disease Paternal Grandmother         Review of Systems   Constitutional:  Positive for fatigue. Negative for fever and unexpected weight change.   HENT:  Negative for congestion, mouth sores, nosebleeds, postnasal drip, rhinorrhea, sore throat and trouble swallowing.    Eyes:  Negative for visual disturbance.   Respiratory:  Negative for apnea, cough, choking, chest tightness, shortness of breath and wheezing.    Cardiovascular:  Negative for chest pain, palpitations and leg swelling.   Gastrointestinal:  Negative for abdominal pain, constipation, diarrhea, nausea and vomiting.   Genitourinary:  Negative for difficulty urinating.   Musculoskeletal:  Negative for arthralgias, back pain, gait problem and myalgias.   Neurological:  Negative for dizziness, weakness and headaches.   Psychiatric/Behavioral:  Negative for sleep disturbance.         Vitals:    02/24/25 0932   BP: 132/70   Pulse: 90   Resp: 16   Temp: 97.8 °F (36.6 °C)      SpO2: 97 %  Ht Readings from Last 1 Encounters:   02/24/25 6' 1.5\" (1.867 m)     Wt Readings from Last 1 Encounters:   02/24/25 240 lb (108.9 kg)     Body mass index is 31.23 kg/m².     Physical Exam  Constitutional:       General: He is not in acute distress.     Appearance: Normal appearance. He is not ill-appearing or diaphoretic.   HENT:      Head: Normocephalic and atraumatic.      Nose: Nose normal. No congestion or rhinorrhea.      Comments: Narrow nares bilaterally L>R     Mouth/Throat:      Mouth: Mucous membranes are moist.      Pharynx: Oropharynx is clear. No oropharyngeal exudate or  posterior oropharyngeal erythema.      Comments: Mallampati class I  palate very small oropharynx   Eyes:      Extraocular Movements: Extraocular movements intact.      Pupils: Pupils are equal, round, and reactive to light.   Cardiovascular:      Rate and Rhythm: Normal rate.      Pulses: Normal pulses.      Heart sounds: Normal heart sounds. No murmur heard.  Pulmonary:      Effort: Pulmonary effort is normal. No respiratory distress.      Breath sounds: Normal breath sounds. No wheezing or rhonchi.   Chest:      Chest wall: No tenderness.   Abdominal:      General: Abdomen is flat. Bowel sounds are normal.      Palpations: Abdomen is soft.   Musculoskeletal:         General: Normal range of motion.   Skin:     General: Skin is warm.   Neurological:      General: No focal deficit present.      Mental Status: He is alert and oriented to person, place, and time.   Psychiatric:         Mood and Affect: Mood normal.         Behavior: Behavior normal.         Thought Content: Thought content normal.         Judgment: Judgment normal.             Labs:  Last BMP  Lab Results   Component Value Date    GLU 92 12/04/2024    BUN 13 12/04/2024    CREATSERUM 1.04 12/04/2024    ANIONGAP 8 12/04/2024    CA 9.5 12/04/2024     12/04/2024    K 4.2 12/04/2024     12/04/2024    CO2 28.0 12/04/2024    OSMOCALC 292 12/04/2024      Last CBC  Lab Results   Component Value Date    WBC 6.5 08/26/2024    RBC 5.10 08/26/2024    HGB 14.9 08/26/2024    HCT 42.9 08/26/2024    MCV 84.1 08/26/2024    MCH 29.2 08/26/2024    MCHC 34.7 08/26/2024    RDW 13.0 08/26/2024    .0 08/26/2024      Last CMP  Lab Results   Component Value Date    GLU 92 12/04/2024    BUN 13 12/04/2024    CREATSERUM 1.04 12/04/2024    ANIONGAP 8 12/04/2024    CA 9.5 12/04/2024    OSMOCALC 292 12/04/2024    ALKPHO 85 12/04/2024    AST 21 12/04/2024    ALT 43 12/04/2024    BILT 0.6 12/04/2024    TP 7.3 12/04/2024    ALB 4.7 12/04/2024    GLOBULIN 2.6  2024     2024    K 4.2 2024     2024    CO2 28.0 2024      Last Thyroid Function  Lab Results   Component Value Date    TSH 1.747 2024        Imaging:  No results found.    Narrative  Performed by [facility] POINT OF CARE TESTING  This result has an attachment that is not available.  [facility]  American Academy of Sleep Medicine (AASM)  Accredited Sleep Center      NAME: Bert Dalal : 7/3/1985  MR#: [id number]  REFERRING: Guanakito Ortega DO    DATE OF STUDY: 2021    STUDY TYPE: Home Sleep Study    HISTORY OF PRESENT ILLNESS: This is a 36 y.o.-year-old male with a body  mass index of 32 kg/(m^2), weight of 251 pounds, height 6 feet 2 inches  with history of snoring, witnessed apnea, fatigue, frequent nocturnal  awakenings, restless sleep.    DESCRIPTION:  The patient underwent an unattended Home Sleep Test (HST)  evaluation.  The patient was educated on how to apply the HST device. The  following parameters were monitored: Peripheral arterial tone, Actigraphy,  Body position, Respiratory movement, Snoring, Heart rate, and Oxygen  saturation via Continuous Pulse Oximetry. The study was scored using the  AASM, 4% desaturation rule for obstructive hypopneas.    FINDINGS: A [employer] One HST device was utilized. Raw data was reviewed  and analyzed. Recording was carried out from 10 PM to 5:48 AM for a total  recording time of 7 hours 48 minutes.  Total Sleep Time was 6 hours 45  minutes.  The percentage of REM time was 20%.      RESPIRATORY FINDINGS: There were 97 apneas and hypopneas giving an (AHI)  apnea-hypopnea index or (HEAVEN) respiratory event index of 14 per hour with  oxygen rachelle of 85%, REM AHI 1.  The central apnea index was 0.6 per hr/ 4  events. The respiratory disturbance events seen during the study totalled  159 and the respiratory disturbance index (RDI) was 23.5 per hour. The RADHA  (oxygen  desaturation index) was 13 per hour, total 87.  Loud snoring and  airflow limitation were seen. Mean intensity of snoring was 47 dB.    OXYGENATION:  Oxygen desaturation rachelle was 85%.  19 minutes or 5% of the  recording was spent with oxygen saturations at/ or below 88%. The patient  had a Baseline /Average oxygen 94 %.    POSITIONAL ANALYSIS: The patient spent 21% of recording in the supine  position. The supine AHI (HEAVEN) was 36 per hour. The non-supine AHI (HEAVEN)  was 9 per hour.    PULSE: Average heart rate 60 beats per minute during sleep. ECG morphology  is not available.    DIAGNOSIS(ES):  1. Obstructive Sleep Apnea, G47.33  Specimen Collected: --    Performed by: [facility] POINT OF CARE TESTING Last Resulted: 09/28/21  4:10 PM        1. Moderate Obstructive sleep Apnea, AHI/ HEAVEN 14 events per hour with   oxygen rachelle 85%.     2. Loud snoring and airflow limitation were noted.       RECOMMENDATIONS:      1.   Treatment options for sleep apnea can include positive airway   pressure (CPAP), oral mandibular device, ENT evaluation (surgical   intervention).     2.  Reduction of upper airway resistance through evaluation and treatment   of nasal or hypopharyngeal sites of constriction or obstruction including   allergic rhinitis, weight loss, and avoidance of the supine position may   be beneficial.     3.   The patient should avoid respiratory depressants including alcohol,   particularly within 3-4 hours of bedtime.         We will attempt to contact this patient to set up an outpatient sleep   evaluation.     Thank you for your support of the Sleep Center.         Daysi Chacon MD, FAASM, FAAN     [place] Sleepiness Scale: (ESS) score on today's visit is 8  out of 24.     Score total of 1-6    Normal sleep   Score total of 7-8    Average sleepiness   Score total of 9-24    Abnormal (possibly pathologic) sleepiness       Impression:    Obstructive sleep apnea syndrome (OSAS): Home sleep study  performed on 9/27/2021 showed  97 apneas and hypopneas giving an (AHI apnea-hypopnea index or (HEAVEN) respiratory event index of 14 per hour with oxygen rachelle of 85%, REM AHI 1.  The central apnea index was 0.6 per hr/ 4  events. The respiratory disturbance events seen during the study totalled 159 and the respiratory disturbance index (RDI) was 23.5 per hour. The RADHA  (oxygen desaturation index) was 13 per hour, total 87.  Loud snoring and airflow limitation were seen. Mean intensity of snoring was 47 dB.  Nocturnal Hypoxemia: Oxygen  saturation rachelle was 85%.  19 minutes or 5% of the recording was spent with oxygen saturations at/ or below 88%. The patient  had a Baseline /Average oxygen 94 %  Daytime hypersomnolence/fatigue  Obesity: Class I   ;  Body mass index is 31.23 kg/m².  Allergic rhinits   Hyperlipidemia                  Plan:    Continue current auto CPAP 5-15 cwp as patient is using and benefiting from CPAP use  Arrange a NEW [employer] for supplies   [place] [facility] consult   Advised about weight loss   Advised about consult with [place] Weight management program   Advised against drowsy driving and to avoid alcoholic beverage and respiratory depressants as these may worsen sleep apnea      Follow up: 4  months     Thank you for allowing me to participate in your patient care.    AYLEEN Sy MD, FACP, FCCP, FAA - Pulmonary/Critical care/Sleep Medicine  Please contact our office if you have any questions or concerns at [phone]    Note to the patient: The 21st Century Cures Act makes medical notes like these available to patients in the interest of transparency. However, be advised that this is a medical document. It is intended as peer to peer communication. It is written in medical language and may contain abbreviations or verbiage that are unfamiliar. It may appear blunt or direct. Medical documents are intended to carry relevant information, facts as evident, and clinical opinion of the  practitioner.      Disclaimer: Components of this note were documented using voice recognition system and are subject to errors not corrected at proofreading. Contact the author of this note for any clarifications         [1] No Known Allergies

## 2025-02-24 NOTE — PROGRESS NOTES
Pt fitted with [employer] Payton sm-med full hybrid mask.   Pt was able to demonstrate on how to properly put on  Payton and how to adjust the headgear.  Pt instructed  on proper cleaning and maintenance of Payton full.   Instructed pt to update the DME company with the type of mask pt will be using so that DME will send  the right mask in pt's  next cpap re supply shipment.  Pt verbalized understanding of all instructions.

## 2025-03-06 ENCOUNTER — OFFICE VISIT (OUTPATIENT)
Dept: INTERNAL MEDICINE CLINIC | Facility: CLINIC | Age: 40
End: 2025-03-06
Payer: COMMERCIAL

## 2025-03-06 VITALS
DIASTOLIC BLOOD PRESSURE: 70 MMHG | OXYGEN SATURATION: 97 % | WEIGHT: 244 LBS | TEMPERATURE: 98 F | BODY MASS INDEX: 32 KG/M2 | SYSTOLIC BLOOD PRESSURE: 134 MMHG | HEART RATE: 76 BPM

## 2025-03-06 DIAGNOSIS — R45.7 EMOTIONAL STRESS: ICD-10-CM

## 2025-03-06 DIAGNOSIS — E66.9 OBESITY (BMI 30-39.9): Primary | ICD-10-CM

## 2025-03-06 PROCEDURE — 99214 OFFICE O/P EST MOD 30 MIN: CPT | Performed by: FAMILY MEDICINE

## 2025-03-06 RX ORDER — NALTREXONE HYDROCHLORIDE 50 MG/1
50 TABLET, FILM COATED ORAL DAILY
Qty: 90 TABLET | Refills: 0 | Status: SHIPPED | OUTPATIENT
Start: 2025-03-06

## 2025-03-06 RX ORDER — BUPROPION HYDROCHLORIDE 150 MG/1
150 TABLET ORAL DAILY
Qty: 90 TABLET | Refills: 0 | Status: SHIPPED | OUTPATIENT
Start: 2025-03-06

## 2025-03-06 NOTE — PROGRESS NOTES
Bert Dalal  7/3/1985    Chief Complaint   Patient presents with    Follow - Up     3 month f/u - weight loss        HPI:   Bert Dalal is a 39 year old male who presents for follow-up. His insurance is no longer covering Wegovy and stopped it about 4-5 weeks ago. He did sprain his ankle which stopped his exercise. He is starting to having cravings again which Wegovy was helping. 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     Current Outpatient Medications   Medication Sig Dispense Refill    losartan 100 MG Oral Tab Take 1 tablet (100 mg total) by mouth daily. 90 tablet 1    atorvastatin 20 MG Oral Tab Take 1 tablet (20 mg total) by mouth every morning. 90 tablet 3      Allergies[1]   Past Medical History:    Essential hypertension    Hyperlipidemia    Obesity    Sleep apnea      Patient Active Problem List   Diagnosis    Primary hypertension    Hypercholesteremia    LEONOR on CPAP    Allergic rhinitis      Past Surgical History:   Procedure Laterality Date    Vasectomy  11/2019      Family History   Problem Relation Age of Onset    Hypertension Mother     Other (pre-dabetes) Father     Stroke Maternal Grandmother     Diabetes Maternal Grandfather     Pulmonary Disease Maternal Grandfather     Diabetes Paternal Grandmother     Pulmonary Disease Paternal Grandmother       Social History     Socioeconomic History    Marital status:    Occupational History    Occupation: Sales     Comment: [employer]   Tobacco Use    Smoking status: Never    Smokeless tobacco: Never   Vaping Use    Vaping status: Never Used   Substance and Sexual Activity    Alcohol use: Yes     Alcohol/week: 6.0 standard drinks of alcohol     Types: 6 Cans of beer per week     Comment: Social drinker    Drug use: Never     Social Drivers of Health     Food Insecurity: Low Risk  (4/10/2023)    [facility]    Food Insecurity     Have there  been times that your food ran out, and you didn't have money to get more?: No     Are there times that you worry that this might happen?: No   Transportation Needs: Low Risk  (4/10/2023)    Received from [facility]    Transportation Needs     Do you have trouble getting transportation to medical appointments?: No     How do you normally get to and from your appointments?: Other   Housing Stability: Low Risk  (4/10/2023)    Received from [facility]    Housing Stability     Are you concerned about having a safe and reliable place to live?: No         REVIEW OF SYSTEMS:   GENERAL: feels well otherwise, no recent illness.     EXAM:   /70 (BP Location: Right arm, Patient Position: Sitting, Cuff Size: large)   Pulse 76   Temp 97.6 °F (36.4 °C) (Temporal)   Wt 244 lb (110.7 kg)   SpO2 97%   BMI 31.76 kg/m²   GENERAL: Well developed, well nourished,in no apparent distress  SKIN: No rash  HEENT: atraumatic, normocephalic  LUNGS: normal work of breathing   CARDIO: regular rate    ASSESSMENT AND PLAN:   Bert Dalal is a 39 year old male who presents for follow-up    Obesity (BMI 30-39.9)  Emotional stress  Unfortunately he is no longer able to continue Wegovy due to his new insurance coverage.  We discussed other medication options for weight loss and decided to begin naltrexone/bupropion combination as this may also benefit some of his underlying emotional stress/anxiety.  He will update me on his symptoms and response to treatment in approximately 1 month and we will plan to follow-up in clinic at our neck scheduled CPE.  - naltrexone 50 MG Oral Tab; Take 1 tablet (50 mg total) by mouth daily.  Dispense: 90 tablet; Refill: 0  - buPROPion  MG Oral Tablet 24 Hr; Take 1 tablet (150 mg total) by mouth daily.  Dispense: 90 tablet; Refill: 0        All questions were answered and the patient agrees  with the plan.     Thank you,  Feliciano Herrera MD         [1] No Known Allergies

## 2025-06-02 DIAGNOSIS — E66.9 OBESITY (BMI 30-39.9): ICD-10-CM

## 2025-06-02 DIAGNOSIS — R45.7 EMOTIONAL STRESS: ICD-10-CM

## 2025-06-03 RX ORDER — BUPROPION HYDROCHLORIDE 150 MG/1
150 TABLET ORAL DAILY
Qty: 90 TABLET | Refills: 3 | Status: SHIPPED | OUTPATIENT
Start: 2025-06-03

## 2025-06-03 NOTE — TELEPHONE ENCOUNTER
Per Last Office Visit: Obesity (BMI 30-39.9)  Emotional stress  Unfortunately he is no longer able to continue Wegovy due to his new insurance coverage.  We discussed other medication options for weight loss and decided to begin naltrexone/bupropion combination as this may also benefit some of his underlying emotional stress/anxiety.  He will update me on his symptoms and response to treatment in approximately 1 month and we will plan to follow-up in clinic at our [facility] scheduled CPE.  - naltrexone 50 MG Oral Tab; Take 1 tablet (50 mg total) by mouth daily.  Dispense: 90 tablet; Refill: 0  - buPROPion  MG Oral Tablet 24 Hr; Take 1 tablet (150 mg total) by mouth daily.  Dispense: 90 tablet; Refill: 0       Is refill appropriate Medication pended for your review/approval

## 2025-06-11 DIAGNOSIS — I10 PRIMARY HYPERTENSION: ICD-10-CM

## 2025-06-12 RX ORDER — LOSARTAN POTASSIUM 100 MG/1
100 TABLET ORAL DAILY
Qty: 90 TABLET | Refills: 3 | Status: SHIPPED | OUTPATIENT
Start: 2025-06-12

## 2025-06-14 DIAGNOSIS — E66.9 OBESITY (BMI 30-39.9): ICD-10-CM

## 2025-06-17 RX ORDER — NALTREXONE HYDROCHLORIDE 50 MG/1
50 TABLET, FILM COATED ORAL DAILY
Qty: 90 TABLET | Refills: 0 | Status: SHIPPED | OUTPATIENT
Start: 2025-06-17

## 2025-07-09 ENCOUNTER — OFFICE VISIT (OUTPATIENT)
Facility: LOCATION | Age: 40
End: 2025-07-09
Payer: COMMERCIAL

## 2025-07-09 DIAGNOSIS — G47.33 OBSTRUCTIVE SLEEP APNEA: ICD-10-CM

## 2025-07-09 DIAGNOSIS — J34.89 NASAL OBSTRUCTION: ICD-10-CM

## 2025-07-09 DIAGNOSIS — J34.8210: ICD-10-CM

## 2025-07-09 DIAGNOSIS — J34.2 DEVIATED SEPTUM: Primary | ICD-10-CM

## 2025-07-09 PROCEDURE — 31231 NASAL ENDOSCOPY DX: CPT | Performed by: OTOLARYNGOLOGY

## 2025-07-09 PROCEDURE — 99204 OFFICE O/P NEW MOD 45 MIN: CPT | Performed by: OTOLARYNGOLOGY

## 2025-07-09 RX ORDER — FLUTICASONE PROPIONATE 50 MCG
2 SPRAY, SUSPENSION (ML) NASAL DAILY
Qty: 16 G | Refills: 3 | Status: SHIPPED | OUTPATIENT
Start: 2025-07-09

## 2025-07-09 NOTE — PROGRESS NOTES
Otolaryngology Consultation Note     Reason for consultation: sleep apnea  Consulting physician and service: Feliciano Herrera MD      HPI: 41 y/o M presents with chronic nasal obstruction for the past several years. Has h/o LEONOR, wears CPAP at night. Overall able to tolerate CPAP ok unless he has URI/cold symptoms. Nasal obstruction worse on the right. No nasal congestion. No clear/purulent rhinorrhea. No facial pain/pressure. No fever/chills. No h/o seasonal allergies. Not on any nasal sprays or OTC antihistamine. No other complaints.      Past Medical History: Past Medical History[1]     Past Surgical History: Past Surgical History[2]     Medication: Scheduled Meds:Scheduled Medications[3]  Continuous Infusions:Medication Infusions[4]  PRN Meds:.PRN Medications[5]     Allergies:  Allergies[6]  Pertinent Family History: Family History[7]     Pertinent Social History:   Social History     Socioeconomic History    Marital status:      Spouse name: Not on file    Number of children: Not on file    Years of education: Not on file    Highest education level: Not on file   Occupational History    Occupation: Sales     Comment: [employer]   Tobacco Use    Smoking status: Never    Smokeless tobacco: Never   Vaping Use    Vaping status: Never Used   Substance and Sexual Activity    Alcohol use: Yes     Alcohol/week: 6.0 standard drinks of alcohol     Types: 6 Cans of beer per week     Comment: Social drinker    Drug use: Never    Sexual activity: Not on file   Other Topics Concern    Not on file   Social History Narrative    Not on file     Social Drivers of Health     Food Insecurity: Low Risk  (4/10/2023)    Received from [facility]    Food Insecurity     Have there been times that your food ran out, and you didn't have money to get more?: No     Are there times that you worry that this might happen?: No   Transportation Needs: Low Risk  (4/10/2023)    Received from [facility]  [facility]    Transportation Needs     Do you have trouble getting transportation to medical appointments?: No     How do you normally get to and from your appointments?: Other   Stress: Not on file   Housing Stability: Low Risk  (4/10/2023)    Received from [facility]    Housing Stability     Are you concerned about having a safe and reliable place to live?: No        Review of Systems:  Constitutional: Negative.  HENT: see above  Eyes: Negative.  Respiratory: Negative.  Cardiovascular: Negative.  Gastrointestinal: Negative.  Musculoskeletal: Negative.  Skin: Negative.  Renal: Negative  Endocrine: Negative  Psychiatric/Behavioral: Negative.     Physical Examination:  Vitals: There were no vitals taken for this visit.     General: Breathing comfortably on room air while sitting up. Able to communicate verbally. Voice normal. Normal appearing body habitus.     Musculoskeletal: Head: Atraumatic and normocephalic.     Neck: Full ROM and able to extend without issues     Ears: External auditory canals clear with no evidence of significant cerumen or stenosis. Tympanic membranes visible with no evidence of retraction or perforation. No evidence of middle ear effusion bilaterally.     Nose: positive kelsey maneuver; No sinus tenderness bilaterally upon palpation. No obvious nasal deformity. No masses, rhinorrhea, epistaxis. Nasal septum deviated to the left; nasal mucosa and turbinates appear normal.     Mouth/Throat: Salivary glands appear normal with no evidence of pain or mass. No masses or lesions noted within the oral mucosa, hard and soft palates, tongue, tonsils and posterior pharynx. Able to tolerate secretions. Oral cavity and oropharynx widely patent. Tonsils 1 plus and symmetric. Posterior pharyngeal walls appear normal. Thyroid non tender to palpation without evidence of mass or nodules.     Eyes: Extraocular movements intact and pupils equally reactive to light stimulus. No  spontaneous or gaze-evoked nystagmus. No proptosis or ecchymosis. VFI.     Lymphatic: No significant cervical lymphadenopathy noted.     Neuro: CN 7 intact with symmetric mobility and strength. No loss of facial sensation.     Skin: Dry, normal turgor, normal color.     Psych: Alert and oriented to person/place/time. Normal affect, amiable     Significant laboratory values: n/a     Imaging: n/a     Procedures:   Otolaryngology Procedure     Patient name: Bert Dalal     YOB: 1952     Procedure: Flexible Fiberoptic Nasal Endoscopy     Indication: nasal obstruction     Anesthesia: Topical 1% lidocaine with oxymetazoline     Surgeon: Cindy Taveras MD     Procedure detail: Benefits and risks discussed with patient, which include bleeding, pain, infection, damage to surrounding structures. Agreement obtained from patient. Patient, while sitting up, was anesthetized with topical anesthetic. This was allowed to sit for 5 minutes. A lubricated flexible fiberoptic endoscopy was inserted through the nasal cavity to the level of the nasopharynx with findings as follows: Septal spur on the left; No masses or lesions in nasal cavities; bilateral inferior, middle and superior turbinates intact; bilateral inferior, middle and superior meati clear; bilateral sphenoethmoid recesses clear. Nasopharynx clear  Scope was then removed and patient tolerated to procedure well without complication.     Dispo: Patient instructed to follow up as instructed within assessment and plan portion of this notation.    [facility] PSG 2021 results reviewed    AHI 14  O2 rachelle 85%    Assessment/Plan:     Deviated septum: start Flonase daily. Recommend septoplasty in OR if symptoms persist/worsen.   Nasal valve collapse: possible etiology for nasal obstruction. Start Flonase daily. Consider repair of nasal valve collapse in OR if symptoms persist/worsen  Nasal obstruction: start Flonase daily.  LEONOR: cont CPAP use.      Dr. Feliciano Herrera,  MD, thank you for involving me in this patient's care. Please contact me with further questions or concerns.    Cindy Taveras MD         [1]   Past Medical History:   Essential hypertension    Hyperlipidemia    Obesity    Sleep apnea   [2]   Past Surgical History:  Procedure Laterality Date    Vasectomy  11/2019   [3] [4] [5] [6] No Known Allergies  [7]   Family History  Problem Relation Age of Onset    Hypertension Mother     Other (pre-dabetes) Father     Stroke Maternal Grandmother     Diabetes Maternal Grandfather     Pulmonary Disease Maternal Grandfather     Diabetes Paternal Grandmother     Pulmonary Disease Paternal Grandmother

## 2025-07-18 ENCOUNTER — MED REC SCAN ONLY (OUTPATIENT)
Facility: CLINIC | Age: 40
End: 2025-07-18

## 2025-08-15 ENCOUNTER — OFFICE VISIT (OUTPATIENT)
Facility: LOCATION | Age: 40
End: 2025-08-15

## 2025-08-15 DIAGNOSIS — J34.829 NASAL VALVE COLLAPSE: ICD-10-CM

## 2025-08-15 DIAGNOSIS — J34.2 DEVIATED SEPTUM: Primary | ICD-10-CM

## 2025-08-15 DIAGNOSIS — G47.33 OBSTRUCTIVE SLEEP APNEA: ICD-10-CM

## 2025-08-15 DIAGNOSIS — J34.89 NASAL OBSTRUCTION: ICD-10-CM

## 2025-08-15 PROCEDURE — 99214 OFFICE O/P EST MOD 30 MIN: CPT | Performed by: OTOLARYNGOLOGY

## 2025-08-26 ENCOUNTER — TELEPHONE (OUTPATIENT)
Age: 40
End: 2025-08-26